# Patient Record
Sex: MALE | URBAN - METROPOLITAN AREA
[De-identification: names, ages, dates, MRNs, and addresses within clinical notes are randomized per-mention and may not be internally consistent; named-entity substitution may affect disease eponyms.]

---

## 2021-10-16 ENCOUNTER — APPOINTMENT (RX ONLY)
Dept: URBAN - METROPOLITAN AREA CLINIC 96 | Facility: CLINIC | Age: 26
Setting detail: DERMATOLOGY
End: 2021-10-16

## 2021-10-16 DIAGNOSIS — L40.0 PSORIASIS VULGARIS: ICD-10-CM

## 2021-10-16 DIAGNOSIS — L72.0 EPIDERMAL CYST: ICD-10-CM

## 2021-10-16 DIAGNOSIS — B07.8 OTHER VIRAL WARTS: ICD-10-CM

## 2021-10-16 DIAGNOSIS — L91.8 OTHER HYPERTROPHIC DISORDERS OF THE SKIN: ICD-10-CM

## 2021-10-16 PROCEDURE — ? TREATMENT REGIMEN

## 2021-10-16 PROCEDURE — ? ADDITIONAL NOTES

## 2021-10-16 PROCEDURE — ? PRESCRIPTION

## 2021-10-16 PROCEDURE — ? COUNSELING

## 2021-10-16 PROCEDURE — 99204 OFFICE O/P NEW MOD 45 MIN: CPT

## 2021-10-16 PROCEDURE — ? FULL BODY SKIN EXAM - DECLINED

## 2021-10-16 RX ORDER — CLOBETASOL PROPIONATE 0.5 MG/G
OINTMENT TOPICAL
Qty: 45 | Refills: 3 | Status: ERX | COMMUNITY
Start: 2021-10-16

## 2021-10-16 RX ADMIN — CLOBETASOL PROPIONATE: 0.5 OINTMENT TOPICAL at 00:00

## 2021-10-16 ASSESSMENT — LOCATION DETAILED DESCRIPTION DERM
LOCATION DETAILED: LEFT SUPERIOR MEDIAL MIDBACK
LOCATION DETAILED: LEFT SUPERIOR MEDIAL MIDBACK
LOCATION DETAILED: LEFT RIB CAGE
LOCATION DETAILED: LEFT PROXIMAL DORSAL MIDDLE FINGER
LOCATION DETAILED: RIGHT ULNAR PALM

## 2021-10-16 ASSESSMENT — LOCATION ZONE DERM
LOCATION ZONE: FINGER
LOCATION ZONE: HAND
LOCATION ZONE: TRUNK
LOCATION ZONE: TRUNK

## 2021-10-16 ASSESSMENT — LOCATION SIMPLE DESCRIPTION DERM
LOCATION SIMPLE: RIGHT HAND
LOCATION SIMPLE: ABDOMEN
LOCATION SIMPLE: LEFT LOWER BACK
LOCATION SIMPLE: LEFT MIDDLE FINGER
LOCATION SIMPLE: LEFT LOWER BACK

## 2021-10-16 ASSESSMENT — TOTAL NUMBER OF VERRUCA VULGARIS: # OF LESIONS?: 1

## 2021-10-16 NOTE — PROCEDURE: ADDITIONAL NOTES
Render Risk Assessment In Note?: no
Detail Level: Simple
Additional Notes: LN2 treatment discussed but patient did not want treatment at this time. Recommended OTC wartstick.

## 2023-07-13 ENCOUNTER — APPOINTMENT (RX ONLY)
Dept: URBAN - METROPOLITAN AREA CLINIC 90 | Facility: CLINIC | Age: 28
Setting detail: DERMATOLOGY
End: 2023-07-13

## 2023-07-13 DIAGNOSIS — L72.0 EPIDERMAL CYST: ICD-10-CM

## 2023-07-13 PROCEDURE — ? COUNSELING

## 2023-07-13 PROCEDURE — ? INCISION AND DRAINAGE

## 2023-07-13 PROCEDURE — ? MEDICATION COUNSELING

## 2023-07-13 PROCEDURE — 10060 I&D ABSCESS SIMPLE/SINGLE: CPT

## 2023-07-13 PROCEDURE — ? PRESCRIPTION MEDICATION MANAGEMENT

## 2023-07-13 PROCEDURE — ? PRESCRIPTION

## 2023-07-13 RX ORDER — DOXYCYCLINE HYCLATE 100 MG/1
TABLET, COATED ORAL
Qty: 28 | Refills: 0 | Status: ERX | COMMUNITY
Start: 2023-07-13

## 2023-07-13 RX ORDER — MUPIROCIN 20 MG/G
OINTMENT TOPICAL
Qty: 22 | Refills: 3 | Status: ERX | COMMUNITY
Start: 2023-07-13

## 2023-07-13 RX ADMIN — DOXYCYCLINE HYCLATE: 100 TABLET, COATED ORAL at 00:00

## 2023-07-13 RX ADMIN — MUPIROCIN: 20 OINTMENT TOPICAL at 00:00

## 2023-07-13 ASSESSMENT — LOCATION SIMPLE DESCRIPTION DERM: LOCATION SIMPLE: LOWER BACK

## 2023-07-13 ASSESSMENT — LOCATION ZONE DERM: LOCATION ZONE: TRUNK

## 2023-07-13 ASSESSMENT — LOCATION DETAILED DESCRIPTION DERM: LOCATION DETAILED: SUPERIOR LUMBAR SPINE

## 2023-07-13 NOTE — PROCEDURE: PRESCRIPTION MEDICATION MANAGEMENT
Detail Level: Zone
Render In Strict Bullet Format?: No
Initiate Treatment: doxycycline hyclate 100 mg tablet : Take one tablet BID x 2 weeks\\n\\nmupirocin 2 % topical ointment : Apply to wound QD

## 2023-07-13 NOTE — PROCEDURE: MEDICATION COUNSELING
Pt. Taken to CT scan at this time.      Ludivina Arreaga  10/29/20 6595 Isotretinoin Counseling: Patient should get monthly blood tests, not donate blood, not drive at night if vision affected, not share medication, and not undergo elective surgery for 6 months after tx completed. Side effects reviewed, pt to contact office should one occur.

## 2023-07-13 NOTE — PROCEDURE: INCISION AND DRAINAGE
Detail Level: Detailed
Lesion Type: Abscess
Method: 15 blade
Curette: No
Anesthesia Type: 1% lidocaine without epinephrine
Anesthesia Volume In Cc: 3
Packing?: iodoform packing strips
Size Of Lesion In Cm (Optional But May Be Required For Some Insurances): 0
Wound Care: Mupirocin
Dressing: pressure dressing
Epidermal Sutures: 4-0 Ethilon
Epidermal Closure: simple interrupted
Suture Text: The incision was partially closed with
Preparation Text: The area was prepped in the usual clean fashion.
Curette Text (Optional): After the contents were expressed a curette was used to partially remove the cyst wall.
Consent was obtained and risks were reviewed including but not limited to delayed wound healing, infection, need for multiple I and D's, and pain.
Render Postcare In Note?: Yes
Post-Care Instructions: I reviewed with the patient in detail post-care instructions. Patient should keep wound covered and call the office should any redness, pain, swelling or worsening occur.

## 2023-07-17 ENCOUNTER — APPOINTMENT (RX ONLY)
Dept: URBAN - METROPOLITAN AREA CLINIC 90 | Facility: CLINIC | Age: 28
Setting detail: DERMATOLOGY
End: 2023-07-17

## 2023-07-17 DIAGNOSIS — Z48.817 ENCOUNTER FOR SURGICAL AFTERCARE FOLLOWING SURGERY ON THE SKIN AND SUBCUTANEOUS TISSUE: ICD-10-CM

## 2023-07-17 PROCEDURE — ? ADDITIONAL NOTES

## 2023-07-17 PROCEDURE — ? POST-OP WOUND CHECK

## 2023-07-17 PROCEDURE — 99024 POSTOP FOLLOW-UP VISIT: CPT

## 2023-07-17 PROCEDURE — ? COUNSELING

## 2023-07-17 PROCEDURE — ? PHOTO-DOCUMENTATION

## 2023-07-17 ASSESSMENT — LOCATION DETAILED DESCRIPTION DERM: LOCATION DETAILED: SUPERIOR LUMBAR SPINE

## 2023-07-17 ASSESSMENT — LOCATION SIMPLE DESCRIPTION DERM: LOCATION SIMPLE: LOWER BACK

## 2023-07-17 ASSESSMENT — LOCATION ZONE DERM: LOCATION ZONE: TRUNK

## 2023-07-17 NOTE — PROCEDURE: POST-OP WOUND CHECK
Detail Level: Detailed
Add 04104 Cpt? (Important Note: In 2017 The Use Of 26840 Is Being Tracked By Cms To Determine Future Global Period Reimbursement For Global Periods): yes
Wound Evaluated By: Farzana Ortiz PAC

## 2023-07-17 NOTE — PROCEDURE: ADDITIONAL NOTES
Additional Notes: Patient states he has not picked up prescriptions from the pharmacy.
Render Risk Assessment In Note?: no
Detail Level: Simple

## 2023-08-12 ENCOUNTER — APPOINTMENT (RX ONLY)
Dept: URBAN - METROPOLITAN AREA CLINIC 90 | Facility: CLINIC | Age: 28
Setting detail: DERMATOLOGY
End: 2023-08-12

## 2023-08-12 DIAGNOSIS — L72.0 EPIDERMAL CYST: ICD-10-CM | Status: RESOLVED

## 2023-08-12 DIAGNOSIS — L81.0 POSTINFLAMMATORY HYPERPIGMENTATION: ICD-10-CM

## 2023-08-12 PROCEDURE — 99212 OFFICE O/P EST SF 10 MIN: CPT

## 2023-08-12 PROCEDURE — ? COUNSELING

## 2023-08-12 ASSESSMENT — LOCATION ZONE DERM: LOCATION ZONE: TRUNK

## 2023-08-12 ASSESSMENT — LOCATION DETAILED DESCRIPTION DERM
LOCATION DETAILED: SUPERIOR LUMBAR SPINE
LOCATION DETAILED: LEFT SUPERIOR MEDIAL MIDBACK

## 2023-08-12 ASSESSMENT — LOCATION SIMPLE DESCRIPTION DERM
LOCATION SIMPLE: LOWER BACK
LOCATION SIMPLE: LEFT LOWER BACK